# Patient Record
Sex: MALE | Race: OTHER | HISPANIC OR LATINO | URBAN - METROPOLITAN AREA
[De-identification: names, ages, dates, MRNs, and addresses within clinical notes are randomized per-mention and may not be internally consistent; named-entity substitution may affect disease eponyms.]

---

## 2021-11-12 ENCOUNTER — EMERGENCY (EMERGENCY)
Facility: HOSPITAL | Age: 36
LOS: 1 days | Discharge: HOME | End: 2021-11-12
Attending: EMERGENCY MEDICINE | Admitting: EMERGENCY MEDICINE
Payer: COMMERCIAL

## 2021-11-12 VITALS
OXYGEN SATURATION: 100 % | SYSTOLIC BLOOD PRESSURE: 128 MMHG | HEART RATE: 104 BPM | RESPIRATION RATE: 16 BRPM | DIASTOLIC BLOOD PRESSURE: 76 MMHG

## 2021-11-12 VITALS
RESPIRATION RATE: 18 BRPM | DIASTOLIC BLOOD PRESSURE: 76 MMHG | OXYGEN SATURATION: 100 % | TEMPERATURE: 99 F | SYSTOLIC BLOOD PRESSURE: 116 MMHG | HEART RATE: 130 BPM

## 2021-11-12 DIAGNOSIS — Z90.49 ACQUIRED ABSENCE OF OTHER SPECIFIED PARTS OF DIGESTIVE TRACT: Chronic | ICD-10-CM

## 2021-11-12 LAB
ALBUMIN SERPL ELPH-MCNC: 4.4 G/DL — SIGNIFICANT CHANGE UP (ref 3.3–5)
ALP SERPL-CCNC: 73 U/L — SIGNIFICANT CHANGE UP (ref 40–120)
ALT FLD-CCNC: 45 U/L — HIGH (ref 4–41)
ANION GAP SERPL CALC-SCNC: 13 MMOL/L — SIGNIFICANT CHANGE UP (ref 7–14)
AST SERPL-CCNC: 29 U/L — SIGNIFICANT CHANGE UP (ref 4–40)
BASOPHILS # BLD AUTO: 0.03 K/UL — SIGNIFICANT CHANGE UP (ref 0–0.2)
BASOPHILS NFR BLD AUTO: 0.3 % — SIGNIFICANT CHANGE UP (ref 0–2)
BILIRUB SERPL-MCNC: 1 MG/DL — SIGNIFICANT CHANGE UP (ref 0.2–1.2)
BUN SERPL-MCNC: 9 MG/DL — SIGNIFICANT CHANGE UP (ref 7–23)
CALCIUM SERPL-MCNC: 9.3 MG/DL — SIGNIFICANT CHANGE UP (ref 8.4–10.5)
CHLORIDE SERPL-SCNC: 102 MMOL/L — SIGNIFICANT CHANGE UP (ref 98–107)
CO2 SERPL-SCNC: 21 MMOL/L — LOW (ref 22–31)
CREAT SERPL-MCNC: 0.89 MG/DL — SIGNIFICANT CHANGE UP (ref 0.5–1.3)
EOSINOPHIL # BLD AUTO: 0 K/UL — SIGNIFICANT CHANGE UP (ref 0–0.5)
EOSINOPHIL NFR BLD AUTO: 0 % — SIGNIFICANT CHANGE UP (ref 0–6)
GLUCOSE SERPL-MCNC: 117 MG/DL — HIGH (ref 70–99)
HCT VFR BLD CALC: 48.3 % — SIGNIFICANT CHANGE UP (ref 39–50)
HGB BLD-MCNC: 15.8 G/DL — SIGNIFICANT CHANGE UP (ref 13–17)
IANC: 9.82 K/UL — HIGH (ref 1.5–8.5)
IMM GRANULOCYTES NFR BLD AUTO: 0.4 % — SIGNIFICANT CHANGE UP (ref 0–1.5)
LIDOCAIN IGE QN: 23 U/L — SIGNIFICANT CHANGE UP (ref 7–60)
LYMPHOCYTES # BLD AUTO: 0.94 K/UL — LOW (ref 1–3.3)
LYMPHOCYTES # BLD AUTO: 8.3 % — LOW (ref 13–44)
MCHC RBC-ENTMCNC: 30 PG — SIGNIFICANT CHANGE UP (ref 27–34)
MCHC RBC-ENTMCNC: 32.7 GM/DL — SIGNIFICANT CHANGE UP (ref 32–36)
MCV RBC AUTO: 91.8 FL — SIGNIFICANT CHANGE UP (ref 80–100)
MONOCYTES # BLD AUTO: 0.53 K/UL — SIGNIFICANT CHANGE UP (ref 0–0.9)
MONOCYTES NFR BLD AUTO: 4.7 % — SIGNIFICANT CHANGE UP (ref 2–14)
NEUTROPHILS # BLD AUTO: 9.82 K/UL — HIGH (ref 1.8–7.4)
NEUTROPHILS NFR BLD AUTO: 86.3 % — HIGH (ref 43–77)
NRBC # BLD: 0 /100 WBCS — SIGNIFICANT CHANGE UP
NRBC # FLD: 0 K/UL — SIGNIFICANT CHANGE UP
PLATELET # BLD AUTO: 231 K/UL — SIGNIFICANT CHANGE UP (ref 150–400)
POTASSIUM SERPL-MCNC: 3.7 MMOL/L — SIGNIFICANT CHANGE UP (ref 3.5–5.3)
POTASSIUM SERPL-SCNC: 3.7 MMOL/L — SIGNIFICANT CHANGE UP (ref 3.5–5.3)
PROT SERPL-MCNC: 7.8 G/DL — SIGNIFICANT CHANGE UP (ref 6–8.3)
RBC # BLD: 5.26 M/UL — SIGNIFICANT CHANGE UP (ref 4.2–5.8)
RBC # FLD: 12.1 % — SIGNIFICANT CHANGE UP (ref 10.3–14.5)
SODIUM SERPL-SCNC: 136 MMOL/L — SIGNIFICANT CHANGE UP (ref 135–145)
WBC # BLD: 11.36 K/UL — HIGH (ref 3.8–10.5)
WBC # FLD AUTO: 11.36 K/UL — HIGH (ref 3.8–10.5)

## 2021-11-12 PROCEDURE — G1004: CPT

## 2021-11-12 PROCEDURE — 74177 CT ABD & PELVIS W/CONTRAST: CPT | Mod: 26,ME

## 2021-11-12 PROCEDURE — 99285 EMERGENCY DEPT VISIT HI MDM: CPT

## 2021-11-12 RX ORDER — ACETAMINOPHEN 500 MG
650 TABLET ORAL ONCE
Refills: 0 | Status: COMPLETED | OUTPATIENT
Start: 2021-11-12 | End: 2021-11-12

## 2021-11-12 RX ORDER — FAMOTIDINE 10 MG/ML
20 INJECTION INTRAVENOUS ONCE
Refills: 0 | Status: COMPLETED | OUTPATIENT
Start: 2021-11-12 | End: 2021-11-12

## 2021-11-12 RX ORDER — SODIUM CHLORIDE 9 MG/ML
1000 INJECTION INTRAMUSCULAR; INTRAVENOUS; SUBCUTANEOUS ONCE
Refills: 0 | Status: COMPLETED | OUTPATIENT
Start: 2021-11-12 | End: 2021-11-12

## 2021-11-12 RX ORDER — ONDANSETRON 8 MG/1
4 TABLET, FILM COATED ORAL ONCE
Refills: 0 | Status: COMPLETED | OUTPATIENT
Start: 2021-11-12 | End: 2021-11-12

## 2021-11-12 RX ORDER — SODIUM CHLORIDE 9 MG/ML
1000 INJECTION, SOLUTION INTRAVENOUS ONCE
Refills: 0 | Status: DISCONTINUED | OUTPATIENT
Start: 2021-11-12 | End: 2021-11-12

## 2021-11-12 RX ADMIN — FAMOTIDINE 20 MILLIGRAM(S): 10 INJECTION INTRAVENOUS at 09:14

## 2021-11-12 RX ADMIN — SODIUM CHLORIDE 1000 MILLILITER(S): 9 INJECTION INTRAMUSCULAR; INTRAVENOUS; SUBCUTANEOUS at 09:13

## 2021-11-12 RX ADMIN — Medication 650 MILLIGRAM(S): at 10:55

## 2021-11-12 RX ADMIN — SODIUM CHLORIDE 2000 MILLILITER(S): 9 INJECTION INTRAMUSCULAR; INTRAVENOUS; SUBCUTANEOUS at 10:40

## 2021-11-12 RX ADMIN — Medication 650 MILLIGRAM(S): at 11:55

## 2021-11-12 RX ADMIN — ONDANSETRON 4 MILLIGRAM(S): 8 TABLET, FILM COATED ORAL at 09:28

## 2021-11-12 RX ADMIN — Medication 30 MILLILITER(S): at 09:14

## 2021-11-12 NOTE — ED ADULT NURSE REASSESSMENT NOTE - NS ED NURSE REASSESS COMMENT FT1
Pt reports pain to abdomen still at 5/10. Declines further pain medication. Dr. Leavitt made aware. CT scan ordered, patient verbalized understanding.

## 2021-11-12 NOTE — ED ADULT NURSE NOTE - OBJECTIVE STATEMENT
Pt received into spot 27. Ambulatory to exam room. Pt complains of generalized abdominal pain with N/V/D and fever since yesterday. Took 2 Motrin this morning at 7am. Pt denies a change in diet. Past medical history of appendectomy 5 years ago. Medications given per MD orders. Pt received into spot 27. Ambulatory to exam room. Pt complains of generalized abdominal pain with N/V/D and fever since yesterday. Took 2 Motrin this morning at 7am. Abdomen soft, nontender to palpation, nondistended, hypoactive bowel sounds. Pt denies a change in diet. Past medical history of appendectomy 5 years ago. Medications given per MD orders.

## 2021-11-12 NOTE — ED ADULT TRIAGE NOTE - CHIEF COMPLAINT QUOTE
Patient has c/o abdominal pain, body aches, nausea and fever since yesterday. Patient has c/o abdominal pain, body aches, nausea and fever since yesterday. Pt states he might have food poisoning.

## 2021-11-12 NOTE — ED PROVIDER NOTE - NSFOLLOWUPINSTRUCTIONS_ED_ALL_ED_FT
Discharge instructions:    - Please follow up with your Primary Care Doctor within the next 3-5 days for follow up.    - We recommend you follow up with a Gastroenterologist for further evaluation; attached are clinics you can make an appointment with.     - An antibiotic was sent to your pharmacy, please take twice a day for the next 10 days.     - Remember to stay well hydrated and advance your diet as tolerated. Take any prescribed medications as instructed:       SEEK IMMEDIATE MEDICAL CARE IF YOU HAVE ANY OF THE FOLLOWING SYMPTOMS: worsening abdominal pain, uncontrollable vomiting, profuse diarrhea, inability to have bowel movements or pass gas, black or bloody stools, fever accompanying chest pain or back pain, or fainting. These symptoms may represent a serious problem that is an emergency. Do not wait to see if the symptoms will go away. Get medical help right away. Call 911 and do not drive yourself to the hospital.

## 2021-11-12 NOTE — ED PROVIDER NOTE - CLINICAL SUMMARY MEDICAL DECISION MAKING FREE TEXT BOX
37yo M w/ history of appendectomy coming in w/ abdominal pain, nausea, nbnb vomiting and diarrhea x2 days; likely viral enteritis; no concern for surgical intraabdominal pathology or diverticulitis given benign abdomen. Will send basic lab work (cbc, cmp lipase) and provide symptomatic relief; if patient is profoundly leukocytotic, will consider CTAP.

## 2021-11-12 NOTE — ED PROVIDER NOTE - ATTENDING CONTRIBUTION TO CARE
36 year old male with a history of appendectomy came to the ED because of watery non-bloody diarrhea with vomiting since yesterday. Fever last night. No abd pain, no chest pain, no sob, no cough. On exam, rrr, lungs cta, abd soft, nt, no rebourn, no guarding, normal skin. Pt improved with meds and IVF, on normal repeat exam of abd, will dc to follow up with pmd/return if worse. Precautions reviewed. 36 year old male with a history of appendectomy came to the ED because of watery non-bloody diarrhea with vomiting since yesterday. Fever last night. No abd pain, no chest pain, no sob, no cough. On exam, rrr, lungs cta, abd soft, nt, no rebound, no guarding, normal skin. Pt continued to have symptoms here so will ct, check labs, observe and reassess.

## 2021-11-12 NOTE — ED PROVIDER NOTE - PATIENT PORTAL LINK FT
You can access the FollowMyHealth Patient Portal offered by [facility] by registering at the following website: [email or web address]. By joining [employer]’s FollowMyHealth portal, you will also be able to view your health information using other applications (apps) compatible with our system.

## 2021-11-12 NOTE — ED PROVIDER NOTE - PHYSICAL EXAMINATION
GENERAL: well appearing in no acute distress, non-toxic appearing  HEAD: normocephalic, atraumatic  HENT: airway intact, neck supple oral mucosa moist, uvula midline, no tonsilar exudates  EYES: normal conjunctiva, EOMI, PERRL  CARDIAC: regular rate and rhythm, normal S1S2, no appreciable murmurs, 2+ pulses in UE/LE b/l  PULM: normal breath sounds, clear to ascultation bilaterally, no rales, rhonchi, wheezing  GI: abdomen nondistended, soft, nontender, no guarding, rebound tenderness  : no CVA tenderness b/l, no suprapubic tenderness  NEURO: no focal motor or sensory deficits  MSK: no peripheral edema, no calf tenderness b/l  SKIN: well-perfused, extremities warm, no visible rashes

## 2021-11-12 NOTE — ED PROVIDER NOTE - OBJECTIVE STATEMENT
35yo M w/ no significant pmh coming in w/ abdominal pain, nausea, nbnb vomiting and diarrhea x2 days. Symptoms associated w/ body aches and fevers; has been taking motrin with symptomatic relief. Denies dark stools or bloody stools. denies eating abnormal food and no one at home has similar symptoms. Has been tolerating PO; ate dinner last night + has been tolerating fluids. Otherwise has been in his normal state of health

## 2021-11-12 NOTE — ED PROVIDER NOTE - NS ED ROS FT
General: + fever, chills  HENT: denies nasal congestion, rhinorrhea  Eyes: denies visual changes, blurred vision  CV: denies chest pain, palpitations  Resp: denies difficulty breathing, cough  Abdominal: + nausea, vomiting, diarrhea, abdominal pain  : denies urinary pain or discharge  MSK: denies muscle aches, leg swelling  Neuro: denies headaches, numbness, tingling  Skin: denies rashes, bruises

## 2021-11-12 NOTE — ED PROVIDER NOTE - PROGRESS NOTE DETAILS
Tree, PGY2: Patient reassessed and continues to note abdominal cramping sensation and persistently tachycardic; will evaluate for further intraabdominal pathologies w/ CTAP Tree, PGY2: Patient reassessed and notes complete resolution of pain + able to tolerate PO. Discussed w/ patient results of CTAP that showed mild pancolitis, likely infectious. Will provide augmentin and d/c with outpatient GI follow up. Strict return precautions provided and patient understands and agrees w/ plan

## 2021-11-12 NOTE — ED ADULT NURSE NOTE - CHIEF COMPLAINT QUOTE
Patient has c/o abdominal pain, body aches, nausea and fever since yesterday. Pt states he might have food poisoning.

## 2021-11-15 ENCOUNTER — APPOINTMENT (OUTPATIENT)
Dept: GASTROENTEROLOGY | Facility: CLINIC | Age: 36
End: 2021-11-15
Payer: COMMERCIAL

## 2021-11-15 VITALS
SYSTOLIC BLOOD PRESSURE: 110 MMHG | WEIGHT: 195.5 LBS | TEMPERATURE: 97.7 F | HEIGHT: 65 IN | BODY MASS INDEX: 32.57 KG/M2 | HEART RATE: 69 BPM | DIASTOLIC BLOOD PRESSURE: 66 MMHG | OXYGEN SATURATION: 99 %

## 2021-11-15 DIAGNOSIS — Z78.9 OTHER SPECIFIED HEALTH STATUS: ICD-10-CM

## 2021-11-15 DIAGNOSIS — K76.0 FATTY (CHANGE OF) LIVER, NOT ELSEWHERE CLASSIFIED: ICD-10-CM

## 2021-11-15 DIAGNOSIS — A09 INFECTIOUS GASTROENTERITIS AND COLITIS, UNSPECIFIED: ICD-10-CM

## 2021-11-15 PROBLEM — Z00.00 ENCOUNTER FOR PREVENTIVE HEALTH EXAMINATION: Status: ACTIVE | Noted: 2021-11-15

## 2021-11-15 PROCEDURE — 99203 OFFICE O/P NEW LOW 30 MIN: CPT

## 2021-11-15 NOTE — HISTORY OF PRESENT ILLNESS
[Heartburn] : denies heartburn [Nausea] : denies nausea [Vomiting] : denies vomiting [Diarrhea] : denies diarrhea [Constipation] : denies constipation [Yellow Skin Or Eyes (Jaundice)] : denies jaundice [Abdominal Pain] : denies abdominal pain [Abdominal Swelling] : denies abdominal swelling [Rectal Pain] : denies rectal pain [Appendectomy] : appendectomy [Wt Gain ___ Lbs] : no recent weight gain [Wt Loss ___ Lbs] : no recent weight loss [GERD] : no gastroesophageal reflux disease [Hiatus Hernia] : no hiatus hernia [Peptic Ulcer Disease] : no peptic ulcer disease [Pancreatitis] : no pancreatitis [Kidney Stone] : no kidney stone [Inflammatory Bowel Disease] : no inflammatory bowel disease [Irritable Bowel Syndrome] : no irritable bowel syndrome [Diverticulitis] : no diverticulitis [Alcohol Abuse] : no alcohol abuse [Malignancy] : no malignancy [Abdominal Surgery] : no abdominal surgery [Cholecystectomy] : no cholecystectomy [de-identified] : 3 days ago patient developed sudden onset lower abdominal pain associated with diarrhea vomited twice at nausea and epigastric pain went to the emergency room where a CT abdomen was done which revealed fatty liver and findings suggestive of pancolitis most likely infectious rest of the CT abdomen was normal his blood work-up revealed leukocytosis with shift to the left normal hemoglobin level liver function test revealed mildly elevated SGPT to 45 rest of the liver function tests were normal.  Renal functions were also normal.  Patient was diagnosed with acute infectious enterocolitis started on antibiotic discharged home and asked to be followed up by his primary physician.  Presently has no GI related symptoms.\par \par Patient had similar symptoms 5 months ago treated for food poisoning.  In between he had no GI related symptoms and in the past no GI related symptoms either. [FreeTextEntry1] : Patient denies history of hypertension, diabetes mellitus, MI, angina, CHF, TIA, CVA.  He does note that he has fatty liver even in the past with normal liver function test.

## 2021-11-15 NOTE — PHYSICAL EXAM
[General Appearance - Alert] : alert [General Appearance - In No Acute Distress] : in no acute distress [Sclera] : the sclera and conjunctiva were normal [PERRL With Normal Accommodation] : pupils were equal in size, round, and reactive to light [Extraocular Movements] : extraocular movements were intact [Outer Ear] : the ears and nose were normal in appearance [Oropharynx] : the oropharynx was normal [Neck Appearance] : the appearance of the neck was normal [Neck Cervical Mass (___cm)] : no neck mass was observed [Jugular Venous Distention Increased] : there was no jugular-venous distention [Thyroid Diffuse Enlargement] : the thyroid was not enlarged [Thyroid Nodule] : there were no palpable thyroid nodules [Heart Rate And Rhythm] : heart rate was normal and rhythm regular [Heart Sounds] : normal S1 and S2 [Heart Sounds Gallop] : no gallops [Murmurs] : no murmurs [Heart Sounds Pericardial Friction Rub] : no pericardial rub [Full Pulse] : the pedal pulses are present [Edema] : there was no peripheral edema [Bowel Sounds] : normal bowel sounds [Abdomen Soft] : soft [Abdomen Tenderness] : non-tender [Abdomen Mass (___ Cm)] : no abdominal mass palpated [Cervical Lymph Nodes Enlarged Posterior Bilaterally] : posterior cervical [Cervical Lymph Nodes Enlarged Anterior Bilaterally] : anterior cervical [Supraclavicular Lymph Nodes Enlarged Bilaterally] : supraclavicular [Axillary Lymph Nodes Enlarged Bilaterally] : axillary [Femoral Lymph Nodes Enlarged Bilaterally] : femoral [Inguinal Lymph Nodes Enlarged Bilaterally] : inguinal [No CVA Tenderness] : no ~M costovertebral angle tenderness [No Spinal Tenderness] : no spinal tenderness [Abnormal Walk] : normal gait [Nail Clubbing] : no clubbing  or cyanosis of the fingernails [Musculoskeletal - Swelling] : no joint swelling seen [Motor Tone] : muscle strength and tone were normal [Skin Color & Pigmentation] : normal skin color and pigmentation [Skin Turgor] : normal skin turgor [] : no rash [Deep Tendon Reflexes (DTR)] : deep tendon reflexes were 2+ and symmetric [Sensation] : the sensory exam was normal to light touch and pinprick [No Focal Deficits] : no focal deficits [Oriented To Time, Place, And Person] : oriented to person, place, and time [Impaired Insight] : insight and judgment were intact [Affect] : the affect was normal

## 2021-11-15 NOTE — REASON FOR VISIT
[Initial Evaluation] : an initial evaluation [FreeTextEntry1] : Patient seen for history of acute onset diarrhea and vomiting with abdominal pain.

## 2021-11-15 NOTE — ASSESSMENT
[FreeTextEntry1] : Patient had an acute episode of infectious enterocolitis which is now resolved.  Fatty liver with minimal elevation of SGPT to 45 no evidence of cirrhosis.  Patient has no other associated medical condition.  Patient was asked to stop antibiotic and exercise every day at least walk 45 minutes a day cut down on the carbohydrates in the diet for the fatty liver.  He will follow up with me as needed.

## 2022-07-13 ENCOUNTER — EMERGENCY (EMERGENCY)
Facility: HOSPITAL | Age: 37
LOS: 1 days | Discharge: HOME | End: 2022-07-13
Attending: EMERGENCY MEDICINE | Admitting: EMERGENCY MEDICINE

## 2022-07-13 VITALS
TEMPERATURE: 97 F | DIASTOLIC BLOOD PRESSURE: 71 MMHG | HEART RATE: 71 BPM | RESPIRATION RATE: 18 BRPM | SYSTOLIC BLOOD PRESSURE: 147 MMHG | OXYGEN SATURATION: 100 %

## 2022-07-13 DIAGNOSIS — Z90.49 ACQUIRED ABSENCE OF OTHER SPECIFIED PARTS OF DIGESTIVE TRACT: Chronic | ICD-10-CM

## 2022-07-13 PROCEDURE — 93971 EXTREMITY STUDY: CPT | Mod: 26,RT

## 2022-07-13 PROCEDURE — 99285 EMERGENCY DEPT VISIT HI MDM: CPT | Mod: 25

## 2022-07-13 PROCEDURE — 73590 X-RAY EXAM OF LOWER LEG: CPT | Mod: 26,RT

## 2022-07-13 PROCEDURE — 73610 X-RAY EXAM OF ANKLE: CPT | Mod: 26,RT

## 2022-07-13 RX ORDER — ACETAMINOPHEN 500 MG
650 TABLET ORAL ONCE
Refills: 0 | Status: COMPLETED | OUTPATIENT
Start: 2022-07-13 | End: 2022-07-13

## 2022-07-13 RX ORDER — IBUPROFEN 200 MG
400 TABLET ORAL ONCE
Refills: 0 | Status: COMPLETED | OUTPATIENT
Start: 2022-07-13 | End: 2022-07-13

## 2022-07-13 RX ADMIN — Medication 400 MILLIGRAM(S): at 10:16

## 2022-07-13 RX ADMIN — Medication 650 MILLIGRAM(S): at 10:16

## 2022-07-13 NOTE — ED PROVIDER NOTE - ATTENDING CONTRIBUTION TO CARE
37M p/w R calf and ankle pain, [place] on cruise ship 2 wk ago and was walking then felt a pop in his calf.  Since then has been having pain and swelling to calf and ankle.  Also noticed bruising to R ankle.  No other injury, has been able to walk on it but there is pain there.  Pt was seen by [employer] ship  and also later at  in [place] and no fx on xray.  R calf ttp w/o deformity, ankle edema and bruising collecting likely dependently from calf injury around lower calf, ankle and foot,  No bony ttp but given bruising will check xray and DVT study.  Likely gastroc tear r/o DVT r/o achilles tendon tear.  Apply splint, discharge lounge to arrange ortho followup.  VS:  unremarkable    GEN - NAD;   well appearing;   A+O x3   HEAD - NC/AT     ENT - PEERL, EOMI, mucous membranes    moist , no discharge      NECK: Neck supple, non-tender without lymphadenopathy, no masses, no JVD  PULM - CTA b/l,  symmetric breath sounds  COR -  normal heart sounds    ABD - , ND, NT, soft,  BACK - no CVA tenderness, nontender spine     EXTREMS - no edema, no deformity, warm and well perfused  R ankle mild swelling and ecchymosis collecting around ankle and into medial foot.  Mild ttp to calf.  achilles R side no palpable deformity, achilles mechanism intact.  Foot able to dorsi/plantar flex.  distal foot NVT intact.  Skin intact.   SKIN - no rash    or bruising      NEUROLOGIC - alert, face symmetric, speech fluent, sensation nl, motor no focal deficit.

## 2022-07-13 NOTE — ED ADULT TRIAGE NOTE - CHIEF COMPLAINT QUOTE
pt c/o right calf pain x 12 days. states he injured himself playing basketball. was seen in an ED in [place]. had negative dvt study. c/o bruising to area as well. pain not relieved with naproxen.   no pmh

## 2022-07-13 NOTE — ED PROVIDER NOTE - CLINICAL SUMMARY MEDICAL DECISION MAKING FREE TEXT BOX
R gastrocnemius and achilles tendon strain. Patient placed in RLE posterior splint with ortho follow up.

## 2022-07-13 NOTE — ED PROVIDER NOTE - OBJECTIVE STATEMENT
37M with no PMH presenting today with R half and ankle pain. Pt states that he was on a cruise two weeks ago, was playing basketball with his son when he fell at felt a sharp pain to his posterior calf. Pt states that after the injury he could not ambulate afterwards. Was seen by soraya szymanski MD who Rx'd pain medication, had follow up in [place] where xrays showed no fracture. Pt states that pain is controlled now with only pain on ambulation. He denies any N/V/C/F/SOB.

## 2022-07-13 NOTE — ED PROVIDER NOTE - PATIENT PORTAL LINK FT
You can access the FollowMyHealth Patient Portal offered by [facility] by registering at the following website: [email or web address]. By joining [employer]’s FollowMyHealth portal, you will also be able to view your health information using other applications (apps) compatible with our system.

## 2022-07-13 NOTE — ED PROVIDER NOTE - CARE PLAN
1 Principal Discharge DX:	Strain of right Achilles tendon  Assessment and plan of treatment:	Patient placed in R posterior splint with ponce compression. F/u with orthopedics tomorrow. Appointment made via social work  Secondary Diagnosis:	Strain of right gastrocnemius muscle

## 2022-07-13 NOTE — ED PROVIDER NOTE - LOCATION
ankle/leg R posterior calf with tenderness on palpation from gastrocs to the insertion of the achilles leg

## 2022-07-13 NOTE — ED PROVIDER NOTE - PHYSICAL EXAMINATION
RLE MMT is 3/5 in plantarflexion and 5/5 to the LLE, pain on palpation to the posterior calf to lateral aspect, ecchymosis to the lateral foot. No PoP to the calcaneus, medial or lateral malleolus, or midfoot. LLE without injury.

## 2022-07-13 NOTE — ED PROVIDER NOTE - VASCULAR COMPROMISE
Principal Discharge DX:	Non-intractable vomiting without nausea, unspecified vomiting type no vascular compromise

## 2022-07-14 ENCOUNTER — APPOINTMENT (OUTPATIENT)
Dept: ORTHOPEDIC SURGERY | Facility: CLINIC | Age: 37
End: 2022-07-14

## 2022-07-14 VITALS
DIASTOLIC BLOOD PRESSURE: 79 MMHG | HEART RATE: 77 BPM | BODY MASS INDEX: 32.65 KG/M2 | SYSTOLIC BLOOD PRESSURE: 124 MMHG | WEIGHT: 196 LBS | HEIGHT: 65 IN

## 2022-07-14 DIAGNOSIS — S86.111A STRAIN OF OTHER MUSCLE(S) AND TENDON(S) OF POSTERIOR MUSCLE GROUP AT LOWER LEG LEVEL, RIGHT LEG, INITIAL ENCOUNTER: ICD-10-CM

## 2022-07-14 PROCEDURE — 99204 OFFICE O/P NEW MOD 45 MIN: CPT

## 2022-07-14 RX ORDER — NAPROXEN 500 MG/1
500 TABLET ORAL
Qty: 10 | Refills: 0 | Status: ACTIVE | COMMUNITY
Start: 2022-07-04

## 2022-07-14 NOTE — DISCUSSION/SUMMARY
[de-identified] : Discussed findings of today's exam and possible causes of patient's pain.  Educated patient on their most probable diagnosis of acute right calf pain due to likely partial tear of the medial gastrocnemius, no evidence of Achilles tendon rupture.  Reviewed possible courses of treatment, and we collaboratively decided best course of treatment at this time will include conservative management.  Patient was educated regarding his clinical findings, he appears to have a partial tear of the medial gastrocnemius, no evidence of Achilles tendon rupture.  Patient is advised this is unlikely to require any surgical consultation or intervention, no need for advanced imaging with MRI at this time.  Patient has been nonweightbearing in a posterior splint, he is advised this is not necessary.  He was fitted with a cam walker boot today.  He was educated he can transition off of crutches and may be full weightbearing as tolerated in his cam boot.  He may transition out of his cam boot over the next 2-4 weeks.  Patient will be started on a course of physical therapy to restore normal range of motion and strength as tolerated.  Patient is unable to perform his regular work duties as an  at this time.   Patient started on a course of oral NSAIDs, prescription given for Naprosyn (We discussed all possible side effects of this medication).   He will be reassessed in 3 weeks.  Patient appreciates and agrees with current plan.\par \par This note was generated using dragon medical dictation software.  A reasonable effort has been made for proofreading its contents, but typos may still remain.  If there are any questions or points of clarification needed please notify my office.

## 2022-07-14 NOTE — PHYSICAL EXAM
[de-identified] : Constitutional: Well-nourished, well-developed, No acute distress\par Respiratory:  Good respiratory effort, no SOB\par Lymphatic: No regional lymphadenopathy, no lymphedema\par Psychiatric: Pleasant and normal affect, alert and oriented x3\par Musculoskeletal: normal except where as noted in regional exam\par \par Right lower Leg:\par APPEARANCE: + Swelling/ecchymosis of the posterior and distal lower leg/ankle, no deformity of the Achilles tendon, \par POSITIVE TENDERNESS: Midportion of medial gastrocnemius at the musculotendinous junction\par NONTENDER: Tibiofemoral jt lines b/l, patellar & quadriceps tendons, Medial and lateral tibial plateua, tibial tuberosity, pes bursa, proximal fibular head, medial/anterior tibial shaft, fibula shaft, medial/lateral malleoli, anterior/posterior tibialis, peroneals, achilles tendon.\par ROM: full & painless in the knee and ankle, no pain with rotation of the leg. \par RESISTIVE TESTING: painless resisted knee flex/ext, Ankle DF/PF/Inversion/Eversion. \par SPECIAL TESTS: neg squeeze test, neg calcaneal bump test\par  [de-identified] : I reviewed, interpreted and clinically correlated the following outside imaging studies,\par In system x-rays, 2 views of the right tibia/fibula, 3 views of the right ankle, no acute fracture seen.\par \par ____________\par \par ACC: [id number] EXAM: XR TIB FIB AP LAT 2 VIEWS RT\par ACC: [id number] EXAM: XR ANKLE COMP MIN 3 VIEWS RT\par \par PROCEDURE DATE: 07/13/2022\par \par \par \par INTERPRETATION: CLINICAL INDICATION: fall; right leg and ankle pain\par \par EXAM:\par AP lateral right leg and frontal oblique and lateral right ankle from 7/13/2022 at 1101. No similar prior studies available for comparison.\par \par IMPRESSION:\par Mild distal calf and ankle soft tissue swelling. No tracking soft tissue gas collections or radiopaque foreign bodies.\par \par No fractures, dislocations, or osseous or joint deformities.\par \par Preserved knee, ankle, and visualized midfoot and hindfoot joint spaces and no joint margin erosions.\par \par No calcaneal spurring and unremarkable distal Achilles tendon shadow.\par \par No discrete lytic or blastic lesions.

## 2022-07-14 NOTE — HISTORY OF PRESENT ILLNESS
[de-identified] : Patient is here for right calf pain that began on 7/1/22. He was playing basketball. He stepped and felt like something hit him in the back of the calf but nothing was there. He was seen by a provider the next day. He had an US performed which was negative for DVT. He went to the ER yesterday where xrays were taken. He was given a splint and crutches. He has used ice and Naproxen to treat it. Denies N/T/R/Prior injury. He is an  and has not worked this week. \par \par The patient's past medical history, past surgical history, medications and allergies were reviewed by me today and documented accordingly. In addition, the patient's family and social history, which were noncontributory to this visit, were reviewed also. Intake form was reviewed. The patient has no family history of arthritis.

## 2022-07-14 NOTE — RETURN TO WORK/SCHOOL
[FreeTextEntry1] : Braeden was seen today for evaluation and management of right calf injury.  He is unable to perform his work duties at this time.  Please excuse him from work until reassessed on 8/5/2022.\par Thank you for your understanding.\par \par Sincerely,\par \par Delbert Alonzo DO, ATC\par Primary Care Sports Medicine\par [facility]\par

## 2022-08-02 ENCOUNTER — NON-APPOINTMENT (OUTPATIENT)
Age: 37
End: 2022-08-02

## 2022-08-10 ENCOUNTER — NON-APPOINTMENT (OUTPATIENT)
Age: 37
End: 2022-08-10

## 2022-08-11 ENCOUNTER — NON-APPOINTMENT (OUTPATIENT)
Age: 37
End: 2022-08-11

## 2024-03-08 ENCOUNTER — APPOINTMENT (OUTPATIENT)
Age: 39
End: 2024-03-08

## 2024-08-07 ENCOUNTER — APPOINTMENT (OUTPATIENT)
Age: 39
End: 2024-08-07

## 2024-09-19 NOTE — ED PROVIDER NOTE - NSFOLLOWUPCLINICS_GEN_ALL_ED_FT
Discharge Instructions    -  Keep dressing clean, dry, and intact until your scheduled office visit   - you can purchase a shower boot from your local pharmacy or cover the leg with trash bags when showering   -  Remain non weight bearing to the left foot as much as possible, you can heel weight bear in your surgical shoe for transfers only   -  Take all medications as prescribed  -  Place ice behind left knee for no more than twenty minutes at a time to help decrease pain and swelling  -  Elevate left lower extremity on top of pillows to help decrease pain and swelling  -  Follow up with Dr. Montilla within 1 week of hospital discharge, you can call [phone] to schedule your appointment   -  Call Dr. Montilla's office sooner if you have any problems, questions, or concerns      
An Orthopedic Surgeon  Orthopedic Surgery  .  [place]   Phone:   Fax:   Follow Up Time: 1-3 Days

## 2024-11-07 ENCOUNTER — APPOINTMENT (OUTPATIENT)
Age: 39
End: 2024-11-07

## 2025-06-06 ENCOUNTER — APPOINTMENT (OUTPATIENT)
Age: 40
End: 2025-06-06

## 2025-07-01 ENCOUNTER — APPOINTMENT (OUTPATIENT)
Age: 40
End: 2025-07-01